# Patient Record
Sex: FEMALE | Race: OTHER | Employment: UNEMPLOYED | URBAN - METROPOLITAN AREA
[De-identification: names, ages, dates, MRNs, and addresses within clinical notes are randomized per-mention and may not be internally consistent; named-entity substitution may affect disease eponyms.]

---

## 2024-04-26 ENCOUNTER — HOSPITAL ENCOUNTER (EMERGENCY)
Facility: HOSPITAL | Age: 34
Discharge: HOME | End: 2024-04-26
Attending: STUDENT IN AN ORGANIZED HEALTH CARE EDUCATION/TRAINING PROGRAM
Payer: COMMERCIAL

## 2024-04-26 ENCOUNTER — APPOINTMENT (OUTPATIENT)
Dept: RADIOLOGY | Facility: HOSPITAL | Age: 34
End: 2024-04-26
Payer: COMMERCIAL

## 2024-04-26 VITALS
RESPIRATION RATE: 18 BRPM | DIASTOLIC BLOOD PRESSURE: 62 MMHG | HEART RATE: 77 BPM | OXYGEN SATURATION: 99 % | BODY MASS INDEX: 26.68 KG/M2 | SYSTOLIC BLOOD PRESSURE: 131 MMHG | WEIGHT: 145 LBS | HEIGHT: 62 IN | TEMPERATURE: 97.3 F

## 2024-04-26 DIAGNOSIS — S92.252A CLOSED AVULSION FRACTURE OF NAVICULAR BONE OF LEFT FOOT, INITIAL ENCOUNTER: Primary | ICD-10-CM

## 2024-04-26 PROCEDURE — 73630 X-RAY EXAM OF FOOT: CPT | Mod: LT

## 2024-04-26 PROCEDURE — 73630 X-RAY EXAM OF FOOT: CPT | Mod: LEFT SIDE | Performed by: RADIOLOGY

## 2024-04-26 PROCEDURE — 99284 EMERGENCY DEPT VISIT MOD MDM: CPT

## 2024-04-26 PROCEDURE — 73610 X-RAY EXAM OF ANKLE: CPT | Mod: LEFT SIDE | Performed by: RADIOLOGY

## 2024-04-26 PROCEDURE — 2500000001 HC RX 250 WO HCPCS SELF ADMINISTERED DRUGS (ALT 637 FOR MEDICARE OP): Performed by: STUDENT IN AN ORGANIZED HEALTH CARE EDUCATION/TRAINING PROGRAM

## 2024-04-26 PROCEDURE — 73610 X-RAY EXAM OF ANKLE: CPT | Mod: LT

## 2024-04-26 PROCEDURE — 99284 EMERGENCY DEPT VISIT MOD MDM: CPT | Performed by: STUDENT IN AN ORGANIZED HEALTH CARE EDUCATION/TRAINING PROGRAM

## 2024-04-26 RX ORDER — IBUPROFEN 400 MG/1
400 TABLET ORAL ONCE
Status: COMPLETED | OUTPATIENT
Start: 2024-04-26 | End: 2024-04-26

## 2024-04-26 RX ORDER — ACETAMINOPHEN 325 MG/1
975 TABLET ORAL ONCE
Status: DISCONTINUED | OUTPATIENT
Start: 2024-04-26 | End: 2024-04-26 | Stop reason: HOSPADM

## 2024-04-26 RX ADMIN — IBUPROFEN 400 MG: 400 TABLET, FILM COATED ORAL at 13:31

## 2024-04-26 ASSESSMENT — COLUMBIA-SUICIDE SEVERITY RATING SCALE - C-SSRS
6. HAVE YOU EVER DONE ANYTHING, STARTED TO DO ANYTHING, OR PREPARED TO DO ANYTHING TO END YOUR LIFE?: NO
2. HAVE YOU ACTUALLY HAD ANY THOUGHTS OF KILLING YOURSELF?: NO
1. IN THE PAST MONTH, HAVE YOU WISHED YOU WERE DEAD OR WISHED YOU COULD GO TO SLEEP AND NOT WAKE UP?: NO

## 2024-04-26 ASSESSMENT — LIFESTYLE VARIABLES
HAVE YOU EVER FELT YOU SHOULD CUT DOWN ON YOUR DRINKING: NO
EVER HAD A DRINK FIRST THING IN THE MORNING TO STEADY YOUR NERVES TO GET RID OF A HANGOVER: NO
TOTAL SCORE: 0
HAVE PEOPLE ANNOYED YOU BY CRITICIZING YOUR DRINKING: NO
EVER FELT BAD OR GUILTY ABOUT YOUR DRINKING: NO

## 2024-04-26 ASSESSMENT — PAIN SCALES - GENERAL: PAINLEVEL_OUTOF10: 10 - WORST POSSIBLE PAIN

## 2024-04-26 ASSESSMENT — PAIN DESCRIPTION - LOCATION: LOCATION: FOOT

## 2024-04-26 ASSESSMENT — PAIN DESCRIPTION - ORIENTATION: ORIENTATION: LEFT

## 2024-04-26 ASSESSMENT — PAIN - FUNCTIONAL ASSESSMENT: PAIN_FUNCTIONAL_ASSESSMENT: 0-10

## 2024-04-26 ASSESSMENT — PAIN DESCRIPTION - PAIN TYPE: TYPE: ACUTE PAIN

## 2024-04-26 NOTE — DISCHARGE INSTRUCTIONS
If you develop fevers, if the limb becomes hard, hot, swollen, red, or if it becomes cold and white or if you lose feeling, or if you become unable to move it, or if you develop tracking redness of the limb, please return to the emergency department immediately for reevaluation.     You can alternate Tylenol and ibuprofen every 4 hours as needed for pain.    Otherwise, please follow-up outpatient with orthopedic surgery and your primary care provider. If you do not have a primary care provider, you may follow-up with the office listed above.

## 2024-04-26 NOTE — ED PROVIDER NOTES
EMERGENCY DEPARTMENT ENCOUNTER      Pt Name: Jessica Silverio  MRN: [id number]  Birthdate 1990  Date of evaluation: 4/26/2024  Provider: Maria Eugenia Rosenberg DO    CHIEF COMPLAINT       Chief Complaint   Patient presents with    Foot Injury     Left          HISTORY OF PRESENT ILLNESS    Otherwise healthy 34-year-old female who presented to the emergency department this morning after tripping on steps when she walked out to grab a package.  She states she is unsure how she came down on the ankle, but developed significant pain in the lateral aspect of the foot and ankle.  When she tried to weight-bear on it, she felt a crunching and severe pain.  No numbness or tingling in the foot, denies any allergies.          Nursing Notes were reviewed.    PAST MEDICAL HISTORY     Past Medical History:   Diagnosis Date    Anxiety disorder, unspecified     Anxiety and depression    Personal history of other diseases of the nervous system and sense organs 03/17/2019    History of eustachian tube dysfunction         SURGICAL HISTORY       Past Surgical History:   Procedure Laterality Date    CHOLECYSTECTOMY  02/24/2016    Cholecystectomy    COLONOSCOPY  02/24/2016    Complete Colonoscopy    UPPER GASTROINTESTINAL ENDOSCOPY  02/24/2016    Endoscopic Ultrasound Upper Gastrointestinal Esophageal         CURRENT MEDICATIONS       There are no discharge medications for this patient.      ALLERGIES     Patient has no known allergies.    FAMILY HISTORY     No family history on file.       SOCIAL HISTORY       Social History     Socioeconomic History    Marital status:      Spouse name: None    Number of children: None    Years of education: None    Highest education level: None   Occupational History    None   Tobacco Use    Smoking status: Never    Smokeless tobacco: Never   Substance and Sexual Activity    Alcohol use: Never    Drug use: Never    Sexual activity: None   Other Topics Concern    None   Social History Narrative    None      Social Determinants of Health     Financial Resource Strain: High Risk (2/22/2021)    Received from [facility]    Overall Financial Resource Strain (CARDIA)     Difficulty of Paying Living Expenses: Hard   Food Insecurity: Food Insecurity Present (2/22/2021)    Received from [facility]    Hunger Vital Sign     Worried About Running Out of Food in the Last Year: Sometimes true     Ran Out of Food in the Last Year: Patient declined   Transportation Needs: Unknown (2/22/2021)    Received from [facility]    PRAPARE - Transportation     Lack of Transportation (Medical): Patient declined     Lack of Transportation (Non-Medical): Patient declined   Physical Activity: Insufficiently Active (2/22/2021)    Received from [facility]    Exercise Vital Sign     Days of Exercise per Week: 1 day     Minutes of Exercise per Session: 20 min   Stress: No Stress Concern Present (2/22/2021)    Received from [facility]    [place] [employer] - Occupational Stress Questionnaire     Feeling of Stress : Only a little   Social Connections: Unknown (2/22/2021)    Received from [facility]    Social Connection and Isolation Panel [NHANES]     Frequency of Communication with Friends and Family: More than three times a week     Frequency of Social Gatherings with Friends and Family: More than three times a week     Attends Mu-ism Services: Patient declined     Active Member of Clubs or Organizations: No     Attends Club or Organization Meetings: Patient declined     Marital Status:    Intimate Partner Violence: Not on file   Housing Stability: Unknown (2/22/2021)    Received from [facility]    Housing Stability Vital Sign     Unable to Pay for Housing in the Last Year: Patient refused     Number of Places Lived in the Last Year: Not on file     Unstable Housing in the Last Year: Patient refused       SCREENINGS                        PHYSICAL EXAM    (up to 7 for level  4, 8 or more for level 5)     ED Triage Vitals   Temperature Heart Rate Respirations BP   04/26/24 1255 04/26/24 1257 04/26/24 1257 04/26/24 1257   36.3 °C (97.3 °F) 77 18 131/62      Pulse Ox Temp Source Heart Rate Source Patient Position   04/26/24 1257 04/26/24 1255 04/26/24 1255 04/26/24 1257   99 % Temporal Monitor Sitting      BP Location FiO2 (%)     04/26/24 1257 --     Right arm        Physical Exam  Vitals and nursing note reviewed.   Constitutional:       General: She is not in acute distress.     Appearance: She is well-developed.   HENT:      Head: Normocephalic and atraumatic.   Eyes:      Conjunctiva/sclera: Conjunctivae normal.   Cardiovascular:      Rate and Rhythm: Normal rate and regular rhythm.      Heart sounds: No murmur heard.  Pulmonary:      Effort: Pulmonary effort is normal. No respiratory distress.      Breath sounds: Normal breath sounds.   Abdominal:      Palpations: Abdomen is soft.      Tenderness: There is no abdominal tenderness.   Musculoskeletal:         General: Swelling (Distal to lateral malleolus of left foot) and tenderness (Left lateral midfoot.  Medial midfoot and forefoot without point tenderness.) present.      Cervical back: Neck supple.      Comments: Neurovascularly intact distal to the injury   Skin:     General: Skin is warm and dry.      Capillary Refill: Capillary refill takes less than 2 seconds.   Neurological:      Mental Status: She is alert.   Psychiatric:         Mood and Affect: Mood normal.          DIAGNOSTIC RESULTS     LABS:  Labs Reviewed - No data to display    All other labs were within normal range or not returned as of this dictation.    Imaging  XR ankle left 3+ views   Final Result   Acute avulsion fracture off of the dorsal cortex of the navicular   bone on the lateral views with overlying soft tissue swelling.        MACRO:   None        Signed by: Gregorio Ortiz 4/26/2024 1:30 PM   Dictation workstation:   [id number]      XR foot left 3+ views    Final Result   Acute avulsion fracture off of the dorsal cortex of the navicular   bone on the lateral views with overlying soft tissue swelling.        MACRO:   None        Signed by: Gregorio Ortiz 4/26/2024 1:30 PM   Dictation workstation:   [id number]           Procedures  Procedures     EMERGENCY DEPARTMENT COURSE/MDM:   Jessica Silverio is a 34 y.o. female presenting to the ED for evaluation of had concerns including Foot Injury (Left )..   Medical Decision Making  Given ice and ibuprofen.  Radiographs obtained of the foot and ankle.  Avulsion fracture of the navicular bone identified.  Patient was placed in the space boot.  She was given crutches.  She is to follow-up outpatient with orthopedic surgery.  Discharged in stable condition        Diagnoses as of 04/26/24 2136   Closed avulsion fracture of navicular bone of left foot, initial encounter          External records reviewed: I reviewed external records including outpatient, PCP records, and prior discharge summaries    I have reviewed this case with the ED attending physician, and the attending agrees with the plan. Patient or family was counselled regarding labs, imaging, likely diagnosis, and plan. All questions were answered.     Maria Eugenia Rosenberg DO  PGY-4, emergency medicine    The above documentation was completed with the use of speech recognition software. It may contain dictation errors secondary to limitations of the software.      ED Medications administered this visit:    Medications   ibuprofen tablet 400 mg (400 mg oral Given 4/26/24 1331)       New Prescriptions from this visit:    There are no discharge medications for this patient.      Final Impression:   1. Closed avulsion fracture of navicular bone of left foot, initial encounter          (Please note that portions of this note were completed with a voice recognition program.  Efforts were made to edit the dictations but occasionally words are mis-transcribed.)     Maria Eugenia Rosenberg,  DO  Resident  04/26/24 2137       Maria Eugenia Rosenberg, DO  Resident  04/26/24 2137

## 2024-05-01 ENCOUNTER — OFFICE VISIT (OUTPATIENT)
Dept: ORTHOPEDIC SURGERY | Facility: CLINIC | Age: 34
End: 2024-05-01
Payer: COMMERCIAL

## 2024-05-01 ENCOUNTER — TELEPHONE (OUTPATIENT)
Dept: ORTHOPEDIC SURGERY | Facility: CLINIC | Age: 34
End: 2024-05-01

## 2024-05-01 DIAGNOSIS — S93.402A MILD ANKLE SPRAIN, LEFT, INITIAL ENCOUNTER: ICD-10-CM

## 2024-05-01 PROCEDURE — 1036F TOBACCO NON-USER: CPT | Performed by: INTERNAL MEDICINE

## 2024-05-01 PROCEDURE — 28450 TX TARSAL B1 FX W/O MNPJ EA: CPT | Performed by: INTERNAL MEDICINE

## 2024-05-01 PROCEDURE — 25622 CLTX CARPL SCPHD FX W/O MNPJ: CPT | Performed by: INTERNAL MEDICINE

## 2024-05-01 PROCEDURE — 99204 OFFICE O/P NEW MOD 45 MIN: CPT | Performed by: INTERNAL MEDICINE

## 2024-05-01 PROCEDURE — 99213 OFFICE O/P EST LOW 20 MIN: CPT | Mod: 57 | Performed by: INTERNAL MEDICINE

## 2024-05-01 NOTE — PROGRESS NOTES
Acute Injury New Patient Visit    CC:   Chief Complaint   Patient presents with    Left Foot - Pain     Lt foot injury  Twisted ankle 4/28  Xrays today       HPI: Jessica is a 34 y.o. female presents today for evaluation for acute left foot/ankle injury sustained last Friday after she missed a step and twisted her left ankle. She is here for initial evaluation.        Review of Systems   GENERAL: Negative for malaise, significant weight loss, fever  MUSCULOSKELETAL: See HPI  NEURO:  Negative for numbness / tingling     Past Medical History  Past Medical History:   Diagnosis Date    Anxiety disorder, unspecified     Anxiety and depression    Personal history of other diseases of the nervous system and sense organs 03/17/2019    History of eustachian tube dysfunction       Medication review  Medication Documentation Review Audit       Reviewed by Tennille Trivedi MA (Technologist) on 05/01/24 at 1600      Medication Order Taking? Sig Documenting Provider Last Dose Status            No Medications to Display                                   Allergies  No Known Allergies    Social History  Social History     Socioeconomic History    Marital status:      Spouse name: Not on file    Number of children: Not on file    Years of education: Not on file    Highest education level: Not on file   Occupational History    Not on file   Tobacco Use    Smoking status: Never    Smokeless tobacco: Never   Substance and Sexual Activity    Alcohol use: Never    Drug use: Never    Sexual activity: Not on file   Other Topics Concern    Not on file   Social History Narrative    Not on file     Social Determinants of Health     Financial Resource Strain: High Risk (2/22/2021)    Received from [facility]    Overall Financial Resource Strain (CARDIA)     Difficulty of Paying Living Expenses: Hard   Food Insecurity: Food Insecurity Present (2/22/2021)    Received from [facility]    Hunger Vital Sign     Worried About  Running Out of Food in the Last Year: Sometimes true     Ran Out of Food in the Last Year: Patient declined   Transportation Needs: Unknown (2/22/2021)    Received from [facility]    PRAPARE - Transportation     Lack of Transportation (Medical): Patient declined     Lack of Transportation (Non-Medical): Patient declined   Physical Activity: Insufficiently Active (2/22/2021)    Received from [facility]    Exercise Vital Sign     Days of Exercise per Week: 1 day     Minutes of Exercise per Session: 20 min   Stress: No Stress Concern Present (2/22/2021)    Received from [facility]    [place] [employer]ational [employer] - Occupational Stress Questionnaire     Feeling of Stress : Only a little   Social Connections: Unknown (2/22/2021)    Received from [facility]    Social Connection and Isolation Panel [NHANES]     Frequency of Communication with Friends and Family: More than three times a week     Frequency of Social Gatherings with Friends and Family: More than three times a week     Attends Yarsani Services: Patient declined     Active Member of Clubs or Organizations: No     Attends Club or Organization Meetings: Patient declined     Marital Status:    Intimate Partner Violence: Not on file   Housing Stability: Unknown (2/22/2021)    Received from [facility]    Housing Stability Vital Sign     Unable to Pay for Housing in the Last Year: Patient refused     Number of Places Lived in the Last Year: Not on file     Unstable Housing in the Last Year: Patient refused       Surgical History  Past Surgical History:   Procedure Laterality Date    CHOLECYSTECTOMY  02/24/2016    Cholecystectomy    COLONOSCOPY  02/24/2016    Complete Colonoscopy    UPPER GASTROINTESTINAL ENDOSCOPY  02/24/2016    Endoscopic Ultrasound Upper Gastrointestinal Esophageal       Physical Exam:  GENERAL:  Patient is awake, alert, and oriented to person place and time.  Patient appears well nourished and well  kept.  Affect Calm, Not Acutely Distressed.  HEENT:  Normocephalic, Atraumatic, EOMI  CARDIOVASCULAR:  Hemodynamically stable.  RESPIRATORY:  Normal respirations with unlabored breathing.  Extremity: Left foot examination shows skin is intact.  There is no erythema or warmth.  Swelling localized on the lateral aspect with associated bruising and ecchymosis.  There is no clinical signs of infection.  There is no pain over the base of the fifth metatarsal bone.  Pain over the dorsal aspect of the navicular bone.  There is no pain in the midfoot.  No pain over the metatarsal bones.  No pain of the cuboid bone.  There is no pain in the calcaneus.  There is no pain over the plantar aponeurosis.  There is no pain over the proximal phalanx of the right great toe.  No pain over distal phalanx of the right great toe.  Neurovascularly intact.    Left ankle shows skin is intact.  There is no erythema or warmth.  Swelling localized on the lateral aspect with bruising and ecchymosis there is no clinical signs of infection.  There is no pain over the lateral malleolus.  There is no pain of the medial malleolus.  Pain over the ATF, CF and PTF ligament.  There is mild pain over the deltoid ligament.  No pain over the Achilles tendon.  Negative Shaffer's test.  Negative squeeze test.  Negative anterior drawer test.  Negative talar tilt test.  No pain over the anterior process of the talus.  There is no pain over the talar dome.  There is no pain at the base of the fifth metatarsal bone.  No pain of the calcaneus.  No pain over the plantar aponeurosis.  No pain of the midfoot.  Neurovascularly intact.      Diagnostics: X-rays reviewed  XR ankle left 3+ views, XR foot left 3+ views  Narrative: Interpreted By:  Gregorio Ortiz,   STUDY:  XR ANKLE LEFT 3+ VIEWS; XR FOOT LEFT 3+ VIEWS;  4/26/2024 1:18 pm      INDICATION:  Signs/Symptoms:injury.      COMPARISON:  None.      ACCESSION NUMBER(S):  [id number]; [id number]      ORDERING  CLINICIAN:  COURTNEY CALDERÓN      TECHNIQUE:  Three views each of the left foot and left ankle were obtained.      FINDINGS:  No significant osteophytic change.  No lytic or blastic destructive bone lesion.  Dorsal midfoot soft tissue swelling. Tiny avulsion fracture fragment  off of the dorsal cortex of the navicular on the lateral view, just  distal to the talonavicular joint space. No other fracture in the  current exams.  Ankle mortise and talar dome are intact. No opaque  soft tissue foreign body. No periosteal reaction or erosion.      Impression: Acute avulsion fracture off of the dorsal cortex of the navicular  bone on the lateral views with overlying soft tissue swelling.      MACRO:  None      Signed by: Gregorio Ortiz 4/26/2024 1:30 PM  Dictation workstation:   [id number]      Procedure: None    Assessment:   Acute avulsion fracture of navicular bone  Left ankle sprain    Plan: Jessica presents today for initial evaluation for acute left foot/ankle injury sustained last Friday. We recommended non surgical treatment by placing her into a fracture boot, ice, elevation, and anti-inflammatories. She will follow-up in 10-14 days, reevaluate her foot and ankle, if doing well, lace up ankle brace.  May consider physical therapy at that time.    No orders of the defined types were placed in this encounter.     At the conclusion of the visit there were no further questions by the patient/family regarding their plan of care.  Patient was instructed to call or return with any issues, questions, or concerns regarding their injury and/or treatment plan described above.     05/01/24 at 4:01 PM - Christopher Fajardo MD  Scribe Attestation  By signing my name below, IScot Scribe   attest that this documentation has been prepared under the direction and in the presence of Christopher Fajardo MD.    Office: [phone]    This note was prepared using voice recognition software.  The details of this note are  correct and have been reviewed, and corrected to the best of my ability.  Some grammatical errors may persist related to the Dragon software.

## 2024-05-01 NOTE — LETTER
May 1, 2024     Patient: Jessica Silverio   YOB: 1990   Date of Visit: 5/1/2024       To Whom It May Concern:    It is my medical opinion that Jessica Silverio may return to work on 5/6/2024 . Please excuse Jessica 5/1/2024-5/5/2024. She may return wearing a walking boot as needed during the work day.     If you have any questions or concerns, please don't hesitate to call.         Sincerely,        Christopher Fajardo MD    CC: No Recipients

## 2024-05-14 ENCOUNTER — OFFICE VISIT (OUTPATIENT)
Dept: ORTHOPEDIC SURGERY | Facility: CLINIC | Age: 34
End: 2024-05-14
Payer: COMMERCIAL

## 2024-05-14 DIAGNOSIS — S93.402A MILD ANKLE SPRAIN, LEFT, INITIAL ENCOUNTER: Primary | ICD-10-CM

## 2024-05-14 DIAGNOSIS — S92.251A AVULSION FRACTURE OF NAVICULAR BONE OF FOOT, RIGHT, CLOSED, INITIAL ENCOUNTER: ICD-10-CM

## 2024-05-14 PROCEDURE — 99024 POSTOP FOLLOW-UP VISIT: CPT | Performed by: INTERNAL MEDICINE

## 2024-05-14 PROCEDURE — L1902 AFO ANKLE GAUNTLET PRE OTS: HCPCS | Performed by: INTERNAL MEDICINE

## 2024-05-14 NOTE — PROGRESS NOTES
CC:   Chief Complaint   Patient presents with    Left Ankle - Follow-up, Pain, Foot Swelling     Acute avulsion fracture of navicular bone   Sprain  DOI:4/28//24   Re evaluate today         HPI: Jessica is a 34 y.o. female presents today for reevaluation for avulsion fracture of the left navicular bone. She notes that she is getting better, still has pain with certain motions.         Review of Systems   GENERAL: Negative for malaise, significant weight loss, fever  MUSCULOSKELETAL: See HPI  NEURO:  Negative for numbness / tingling     Past Medical History  Past Medical History:   Diagnosis Date    Anxiety disorder, unspecified     Anxiety and depression    Personal history of other diseases of the nervous system and sense organs 03/17/2019    History of eustachian tube dysfunction       Medication review  Medication Documentation Review Audit       Reviewed by Tennille Trivedi MA (Technologist) on 05/01/24 at 1600      Medication Order Taking? Sig Documenting Provider Last Dose Status            No Medications to Display                                   Allergies  No Known Allergies    Social History  Social History     Socioeconomic History    Marital status:      Spouse name: Not on file    Number of children: Not on file    Years of education: Not on file    Highest education level: Not on file   Occupational History    Not on file   Tobacco Use    Smoking status: Never    Smokeless tobacco: Never   Substance and Sexual Activity    Alcohol use: Never    Drug use: Never    Sexual activity: Not on file   Other Topics Concern    Not on file   Social History Narrative    Not on file     Social Determinants of Health     Financial Resource Strain: High Risk (2/22/2021)    Received from [facility]    Overall Financial Resource Strain (CARDIA)     Difficulty of Paying Living Expenses: Hard   Food Insecurity: Food Insecurity Present (2/22/2021)    Received from [facility]    Hunger Vital Sign      Worried About Running Out of Food in the Last Year: Sometimes true     Ran Out of Food in the Last Year: Patient declined   Transportation Needs: Unknown (2/22/2021)    Received from [facility]    PRAPARE - Transportation     Lack of Transportation (Medical): Patient declined     Lack of Transportation (Non-Medical): Patient declined   Physical Activity: Insufficiently Active (2/22/2021)    Received from [facility]    Exercise Vital Sign     Days of Exercise per Week: 1 day     Minutes of Exercise per Session: 20 min   Stress: No Stress Concern Present (2/22/2021)    Received from [facility]    [place] [employer] - Occupational Stress Questionnaire     Feeling of Stress : Only a little   Social Connections: Unknown (2/22/2021)    Received from [facility]    Social Connection and Isolation Panel [NHANES]     Frequency of Communication with Friends and Family: More than three times a week     Frequency of Social Gatherings with Friends and Family: More than three times a week     Attends Mandaen Services: Patient declined     Active Member of Clubs or Organizations: No     Attends Club or Organization Meetings: Patient declined     Marital Status:    Intimate Partner Violence: Not on file   Housing Stability: Unknown (2/22/2021)    Received from [facility]    Housing Stability Vital Sign     Unable to Pay for Housing in the Last Year: Patient refused     Number of Places Lived in the Last Year: Not on file     Unstable Housing in the Last Year: Patient refused       Surgical History  Past Surgical History:   Procedure Laterality Date    CHOLECYSTECTOMY  02/24/2016    Cholecystectomy    COLONOSCOPY  02/24/2016    Complete Colonoscopy    UPPER GASTROINTESTINAL ENDOSCOPY  02/24/2016    Endoscopic Ultrasound Upper Gastrointestinal Esophageal       Physical Exam:  GENERAL:  Patient is awake, alert, and oriented to person place and time.  Patient appears well  nourished and well kept.  Affect Calm, Not Acutely Distressed.  HEENT:  Normocephalic, Atraumatic, EOMI  CARDIOVASCULAR:  Hemodynamically stable.  RESPIRATORY:  Normal respirations with unlabored breathing.  Extremity: Left foot examination shows skin is intact.  There is no erythema or warmth.  Resolved swelling localized on the lateral aspect with solved bruising and ecchymosis.  There is no clinical signs of infection.  There is no pain over the base of the fifth metatarsal bone.  Minimal pain over the dorsal aspect of the navicular bone.  There is no pain in the midfoot.  No pain over the metatarsal bones.  No pain of the cuboid bone.  There is no pain in the calcaneus.  There is no pain over the plantar aponeurosis.  There is no pain over the proximal phalanx of the right great toe.  No pain over distal phalanx of the right great toe.  Neurovascularly intact.     Left ankle shows skin is intact.  There is no erythema or warmth.  Resolved swelling localized on the lateral aspect with resolved bruising and ecchymosis there is no clinical signs of infection.  There is no pain over the lateral malleolus.  There is no pain of the medial malleolus.  No pain over the ATF, CF or PTF ligament.  No pain over the deltoid ligament.  No pain over the Achilles tendon.  Negative Shaffer's test.  Negative squeeze test.  Negative anterior drawer test.  Negative talar tilt test.  No pain over the anterior process of the talus.  There is no pain over the talar dome.  There is no pain at the base of the fifth metatarsal bone.  No pain of the calcaneus.  No pain over the plantar aponeurosis.  No pain of the midfoot.  Neurovascularly intact.       Diagnostics: X-rays reviewed  XR ankle left 3+ views, XR foot left 3+ views  Narrative: Interpreted By:  Gregorio Ortiz,   STUDY:  XR ANKLE LEFT 3+ VIEWS; XR FOOT LEFT 3+ VIEWS;  4/26/2024 1:18 pm      INDICATION:  Signs/Symptoms:injury.      COMPARISON:  None.      ACCESSION  NUMBER(S):  [id number]; [id number]      ORDERING CLINICIAN:  COURTNEY CALDERÓN      TECHNIQUE:  Three views each of the left foot and left ankle were obtained.      FINDINGS:  No significant osteophytic change.  No lytic or blastic destructive bone lesion.  Dorsal midfoot soft tissue swelling. Tiny avulsion fracture fragment  off of the dorsal cortex of the navicular on the lateral view, just  distal to the talonavicular joint space. No other fracture in the  current exams.  Ankle mortise and talar dome are intact. No opaque  soft tissue foreign body. No periosteal reaction or erosion.      Impression: Acute avulsion fracture off of the dorsal cortex of the navicular  bone on the lateral views with overlying soft tissue swelling.      MACRO:  None      Signed by: Gregorio Ortiz 4/26/2024 1:30 PM  Dictation workstation:   [id number]        Procedure: None    Assessment:   Acute avulsion fracture of navicular bone  Left ankle sprain    Plan: Jessica presents today for reevaluation for avulsion fracture of the left navicular bone. She is clinically doing better, some mild pain. We weaned her from the fracture boot and placed her into a lace up ankle brace.  Will also get involved some formal physical therapy to improve her range of motion and strength and to improve her ankle stability, she will follow-up as needed.    No orders of the defined types were placed in this encounter.     At the conclusion of the visit there were no further questions by the patient/family regarding their plan of care.  Patient was instructed to call or return with any issues, questions, or concerns regarding their injury and/or treatment plan described above.     05/14/24 at 11:10 AM - Christopher Fajardo MD  Scribe Attestation  By signing my name below, IScot Scribe   attest that this documentation has been prepared under the direction and in the presence of Christopher Fajardo MD.    Office: [phone]    This note was  prepared using voice recognition software.  The details of this note are correct and have been reviewed, and corrected to the best of my ability.  Some grammatical errors may persist related to the Dragon software.

## 2025-07-30 ENCOUNTER — HOSPITAL ENCOUNTER (EMERGENCY)
Facility: HOSPITAL | Age: 35
Discharge: HOME | End: 2025-07-30
Attending: STUDENT IN AN ORGANIZED HEALTH CARE EDUCATION/TRAINING PROGRAM
Payer: COMMERCIAL

## 2025-07-30 ENCOUNTER — APPOINTMENT (OUTPATIENT)
Dept: RADIOLOGY | Facility: HOSPITAL | Age: 35
End: 2025-07-30
Payer: COMMERCIAL

## 2025-07-30 VITALS
TEMPERATURE: 97.5 F | HEIGHT: 63 IN | HEART RATE: 82 BPM | SYSTOLIC BLOOD PRESSURE: 130 MMHG | WEIGHT: 153 LBS | OXYGEN SATURATION: 98 % | BODY MASS INDEX: 27.11 KG/M2 | RESPIRATION RATE: 16 BRPM | DIASTOLIC BLOOD PRESSURE: 88 MMHG

## 2025-07-30 DIAGNOSIS — M54.2 CERVICAL MUSCLE PAIN: ICD-10-CM

## 2025-07-30 DIAGNOSIS — G44.209 TENSION HEADACHE: Primary | ICD-10-CM

## 2025-07-30 LAB
ALBUMIN SERPL BCP-MCNC: 4.6 G/DL (ref 3.4–5)
ALP SERPL-CCNC: 50 U/L (ref 33–110)
ALT SERPL W P-5'-P-CCNC: 9 U/L (ref 7–45)
ANION GAP SERPL CALC-SCNC: 13 MMOL/L (ref 10–20)
AST SERPL W P-5'-P-CCNC: 12 U/L (ref 9–39)
B-HCG SERPL-ACNC: <2 MIU/ML
BASOPHILS # BLD AUTO: 0.03 X10*3/UL (ref 0–0.1)
BASOPHILS NFR BLD AUTO: 0.4 %
BILIRUB SERPL-MCNC: 0.5 MG/DL (ref 0–1.2)
BUN SERPL-MCNC: 8 MG/DL (ref 6–23)
CALCIUM SERPL-MCNC: 9.4 MG/DL (ref 8.6–10.3)
CHLORIDE SERPL-SCNC: 105 MMOL/L (ref 98–107)
CO2 SERPL-SCNC: 23 MMOL/L (ref 21–32)
CREAT SERPL-MCNC: 0.57 MG/DL (ref 0.5–1.05)
EGFRCR SERPLBLD CKD-EPI 2021: >90 ML/MIN/1.73M*2
EOSINOPHIL # BLD AUTO: 0.03 X10*3/UL (ref 0–0.7)
EOSINOPHIL NFR BLD AUTO: 0.4 %
ERYTHROCYTE [DISTWIDTH] IN BLOOD BY AUTOMATED COUNT: 13 % (ref 11.5–14.5)
GLUCOSE SERPL-MCNC: 93 MG/DL (ref 74–99)
HCT VFR BLD AUTO: 44.1 % (ref 36–46)
HGB BLD-MCNC: 14 G/DL (ref 12–16)
IMM GRANULOCYTES # BLD AUTO: 0.02 X10*3/UL (ref 0–0.7)
IMM GRANULOCYTES NFR BLD AUTO: 0.3 % (ref 0–0.9)
LYMPHOCYTES # BLD AUTO: 1.01 X10*3/UL (ref 1.2–4.8)
LYMPHOCYTES NFR BLD AUTO: 14.7 %
MCH RBC QN AUTO: 26.7 PG (ref 26–34)
MCHC RBC AUTO-ENTMCNC: 31.7 G/DL (ref 32–36)
MCV RBC AUTO: 84 FL (ref 80–100)
MONOCYTES # BLD AUTO: 0.28 X10*3/UL (ref 0.1–1)
MONOCYTES NFR BLD AUTO: 4.1 %
NEUTROPHILS # BLD AUTO: 5.48 X10*3/UL (ref 1.2–7.7)
NEUTROPHILS NFR BLD AUTO: 80.1 %
NRBC BLD-RTO: 0 /100 WBCS (ref 0–0)
PLATELET # BLD AUTO: 188 X10*3/UL (ref 150–450)
POTASSIUM SERPL-SCNC: 3.8 MMOL/L (ref 3.5–5.3)
PROT SERPL-MCNC: 8.5 G/DL (ref 6.4–8.2)
RBC # BLD AUTO: 5.25 X10*6/UL (ref 4–5.2)
SODIUM SERPL-SCNC: 137 MMOL/L (ref 136–145)
WBC # BLD AUTO: 6.9 X10*3/UL (ref 4.4–11.3)

## 2025-07-30 PROCEDURE — 36415 COLL VENOUS BLD VENIPUNCTURE: CPT | Performed by: STUDENT IN AN ORGANIZED HEALTH CARE EDUCATION/TRAINING PROGRAM

## 2025-07-30 PROCEDURE — 85025 COMPLETE CBC W/AUTO DIFF WBC: CPT | Performed by: STUDENT IN AN ORGANIZED HEALTH CARE EDUCATION/TRAINING PROGRAM

## 2025-07-30 PROCEDURE — 70450 CT HEAD/BRAIN W/O DYE: CPT | Performed by: STUDENT IN AN ORGANIZED HEALTH CARE EDUCATION/TRAINING PROGRAM

## 2025-07-30 PROCEDURE — 99285 EMERGENCY DEPT VISIT HI MDM: CPT | Mod: 25 | Performed by: STUDENT IN AN ORGANIZED HEALTH CARE EDUCATION/TRAINING PROGRAM

## 2025-07-30 PROCEDURE — 80053 COMPREHEN METABOLIC PANEL: CPT | Performed by: STUDENT IN AN ORGANIZED HEALTH CARE EDUCATION/TRAINING PROGRAM

## 2025-07-30 PROCEDURE — 72125 CT NECK SPINE W/O DYE: CPT

## 2025-07-30 PROCEDURE — 2500000004 HC RX 250 GENERAL PHARMACY W/ HCPCS (ALT 636 FOR OP/ED): Mod: JZ | Performed by: STUDENT IN AN ORGANIZED HEALTH CARE EDUCATION/TRAINING PROGRAM

## 2025-07-30 PROCEDURE — 70450 CT HEAD/BRAIN W/O DYE: CPT

## 2025-07-30 PROCEDURE — 2500000001 HC RX 250 WO HCPCS SELF ADMINISTERED DRUGS (ALT 637 FOR MEDICARE OP): Performed by: STUDENT IN AN ORGANIZED HEALTH CARE EDUCATION/TRAINING PROGRAM

## 2025-07-30 PROCEDURE — 96374 THER/PROPH/DIAG INJ IV PUSH: CPT | Mod: 59

## 2025-07-30 PROCEDURE — 72125 CT NECK SPINE W/O DYE: CPT | Performed by: STUDENT IN AN ORGANIZED HEALTH CARE EDUCATION/TRAINING PROGRAM

## 2025-07-30 PROCEDURE — 2550000001 HC RX 255 CONTRASTS: Performed by: STUDENT IN AN ORGANIZED HEALTH CARE EDUCATION/TRAINING PROGRAM

## 2025-07-30 PROCEDURE — 84702 CHORIONIC GONADOTROPIN TEST: CPT | Performed by: STUDENT IN AN ORGANIZED HEALTH CARE EDUCATION/TRAINING PROGRAM

## 2025-07-30 PROCEDURE — 70496 CT ANGIOGRAPHY HEAD: CPT

## 2025-07-30 RX ORDER — KETOROLAC TROMETHAMINE 15 MG/ML
15 INJECTION, SOLUTION INTRAMUSCULAR; INTRAVENOUS ONCE
Status: COMPLETED | OUTPATIENT
Start: 2025-07-30 | End: 2025-07-30

## 2025-07-30 RX ORDER — ACETAMINOPHEN 325 MG/1
650 TABLET ORAL ONCE
Status: COMPLETED | OUTPATIENT
Start: 2025-07-30 | End: 2025-07-30

## 2025-07-30 RX ORDER — METOCLOPRAMIDE HYDROCHLORIDE 5 MG/ML
10 INJECTION INTRAMUSCULAR; INTRAVENOUS ONCE
Status: DISCONTINUED | OUTPATIENT
Start: 2025-07-30 | End: 2025-07-30 | Stop reason: HOSPADM

## 2025-07-30 RX ADMIN — IOHEXOL 70 ML: 350 INJECTION, SOLUTION INTRAVENOUS at 18:24

## 2025-07-30 RX ADMIN — ACETAMINOPHEN 650 MG: 325 TABLET ORAL at 17:15

## 2025-07-30 RX ADMIN — KETOROLAC TROMETHAMINE 15 MG: 15 INJECTION, SOLUTION INTRAMUSCULAR; INTRAVENOUS at 18:49

## 2025-07-30 ASSESSMENT — PAIN SCALES - GENERAL
PAINLEVEL_OUTOF10: 6
PAINLEVEL_OUTOF10: 0 - NO PAIN

## 2025-07-30 ASSESSMENT — PAIN - FUNCTIONAL ASSESSMENT
PAIN_FUNCTIONAL_ASSESSMENT: 0-10
PAIN_FUNCTIONAL_ASSESSMENT: 0-10

## 2025-07-30 ASSESSMENT — PAIN DESCRIPTION - LOCATION: LOCATION: HEAD

## 2025-07-30 ASSESSMENT — LIFESTYLE VARIABLES
HAVE PEOPLE ANNOYED YOU BY CRITICIZING YOUR DRINKING: NO
EVER HAD A DRINK FIRST THING IN THE MORNING TO STEADY YOUR NERVES TO GET RID OF A HANGOVER: NO
HAVE YOU EVER FELT YOU SHOULD CUT DOWN ON YOUR DRINKING: NO
TOTAL SCORE: 0
EVER FELT BAD OR GUILTY ABOUT YOUR DRINKING: NO

## 2025-07-30 ASSESSMENT — PAIN DESCRIPTION - PAIN TYPE: TYPE: ACUTE PAIN

## 2025-07-30 NOTE — ED TRIAGE NOTES
Patient reports headache felt in back of head that radiates down to mid back X1 week. Headache is off and on. Reports occasionally feeling a cool sensation in arms when she lifts her shoulders.

## 2025-07-30 NOTE — DISCHARGE INSTRUCTIONS
You were evaluated in the emergency department for a headache. Your work-up did not reveal any acute, emergent findings requiring intervention that would be the cause of your symptoms.     Please follow up with your primary care for a follow up emergency room visit.    Please seek immediate medical attention if you develop: worsening headache, nausea, vomiting, confusion, weakness, loss of movement in your arms or legs, loss of control of your bladder or bowels, difficulty waking form sleep, neck pain, fever above 100.4F, or any new or concerning symptoms.

## 2025-07-30 NOTE — ED PROVIDER NOTES
EMERGENCY DEPARTMENT ENCOUNTER      Pt Name: Jessica Silverio  MRN: [id number]  Birthdate 1990  Date of evaluation: 7/30/2025  Provider: Stefani LoPresti, DO    CHIEF COMPLAINT       Chief Complaint   Patient presents with    Headache         HISTORY OF PRESENT ILLNESS    Patient is a 35-year-old female with no significant past medical history presenting to the ED with pain from her occiput down to the mid Thoracics.  States that it just feels tight and occasionally is throbbing.  The pain initially started in the back of her head and has been going on for an extended period of time but over the past month it has also moved down to her Thoracics.  It does feel tight when she is turning her head but she is not getting shooting pain.  The pain has been on and off.  Overall she feels fatigued.  She has tried Motrin and heating pad which has not helped a ton.  She has never experienced anything like this before.  She states that she has had some lightheadedness that just an overall foggy feeling.  She does notice that when she is anxious it does worsen the pain.  She denies any blurry vision, dizziness, syncope, chest pain, shortness of breath, numbness, tingling, incontinence or saddle paresthesias or vomiting.      History provided by:  Patient   used: No        Nursing Notes were reviewed.    PAST MEDICAL HISTORY   Medical History[1]      SURGICAL HISTORY     Surgical History[2]      CURRENT MEDICATIONS       There are no discharge medications for this patient.      ALLERGIES     Patient has no known allergies.    FAMILY HISTORY     Family History[3]       SOCIAL HISTORY     Social History[4]    SCREENINGS                        PHYSICAL EXAM    (up to 7 for level 4, 8 or more for level 5)     ED Triage Vitals [07/30/25 1324]   Temperature Heart Rate Respirations BP   36.4 °C (97.5 °F) 83 15 138/88      Pulse Ox Temp Source Heart Rate Source Patient Position   99 % Temporal Monitor Sitting      BP  Location FiO2 (%)     Right arm --       Physical Exam  Vitals and nursing note reviewed.   Constitutional:       General: She is not in acute distress.     Appearance: Normal appearance. She is not ill-appearing.   HENT:      Head: Normocephalic and atraumatic.      Nose: Nose normal.      Mouth/Throat:      Mouth: Mucous membranes are moist.     Eyes:      Extraocular Movements: Extraocular movements intact.      Pupils: Pupils are equal, round, and reactive to light.       Cardiovascular:      Rate and Rhythm: Normal rate and regular rhythm.      Pulses: Normal pulses.      Heart sounds: Normal heart sounds.   Pulmonary:      Effort: Pulmonary effort is normal. No respiratory distress.      Breath sounds: No stridor. No wheezing.   Abdominal:      General: Abdomen is flat. There is no distension.      Palpations: Abdomen is soft.      Tenderness: There is no abdominal tenderness.     Musculoskeletal:      Cervical back: Normal range of motion and neck supple. No rigidity. Muscular tenderness present. No spinous process tenderness. Normal range of motion.      Right lower leg: No edema.      Left lower leg: No edema.     Skin:     General: Skin is warm and dry.      Capillary Refill: Capillary refill takes less than 2 seconds.     Neurological:      General: No focal deficit present.      Mental Status: She is alert and oriented to person, place, and time.      Cranial Nerves: Cranial nerves 2-12 are intact. No cranial nerve deficit.      Sensory: Sensation is intact. No sensory deficit.      Motor: Motor function is intact. No weakness.      Gait: Gait is intact.     Psychiatric:         Mood and Affect: Mood normal.         Behavior: Behavior normal.          DIAGNOSTIC RESULTS     LABS:  Labs Reviewed   CBC WITH AUTO DIFFERENTIAL - Abnormal       Result Value    WBC 6.9      nRBC 0.0      RBC 5.25 (*)     Hemoglobin 14.0      Hematocrit 44.1      MCV 84      MCH 26.7      MCHC 31.7 (*)     RDW 13.0       Platelets 188      Neutrophils % 80.1      Immature Granulocytes %, Automated 0.3      Lymphocytes % 14.7      Monocytes % 4.1      Eosinophils % 0.4      Basophils % 0.4      Neutrophils Absolute 5.48      Immature Granulocytes Absolute, Automated 0.02      Lymphocytes Absolute 1.01 (*)     Monocytes Absolute 0.28      Eosinophils Absolute 0.03      Basophils Absolute 0.03     COMPREHENSIVE METABOLIC PANEL - Abnormal    Glucose 93      Sodium 137      Potassium 3.8      Chloride 105      Bicarbonate 23      Anion Gap 13      Urea Nitrogen 8      Creatinine 0.57      eGFR >90      Calcium 9.4      Albumin 4.6      Alkaline Phosphatase 50      Total Protein 8.5 (*)     AST 12      Bilirubin, Total 0.5      ALT 9     HUMAN CHORIONIC GONADOTROPIN, SERUM QUANTITATIVE - Normal    HCG, Beta-Quantitative <2      Narrative:      Total HCG measurement is performed using the [employer]   Immunoassay which detects intact HCG and free beta HCG subunit.    This test is not indicated for use as a tumor marker.   HCG testing is performed using a different test methodology at [facility] than other [facility]. Direct result comparison   should only be made within the same method.           All other labs were within normal range or not returned as of this dictation.    Imaging  CT angio head and neck w and wo IV contrast   Final Result   The CT angiogram through the upper thorax demonstrates no significant   stenosis along the origins of the right brachiocephalic artery, left   common carotid artery, or left subclavian artery. No significant   stenosis noted along the origins of the vertebral arteries.        The CT angiogram of the neck demonstrates no significant stenosis   along the common carotid arteries, carotid bifurcations, or cervical   segments of the internal carotid arteries. No significant stenosis   noted along the cervical segments of the vertebral arteries.        The CT angiogram  images of the head demonstrate no significant   stenosis along the distal internal carotid arteries, distal vertebral   arteries, or basilar artery. No large vessel branch cutoffs of the   anterior cerebral arteries, middle cerebral arteries, or posterior   cerebral arteries are noted. No intracranial aneurysm is noted.        Please see the dedicated reports of the CT of the head and CT of the   cervical spine dated 07/30/2025 for further details of findings in   these regions.        MACRO:   None        Signed by: Marcio Peterson 7/30/2025 7:09 PM   Dictation workstation:   [id number]      CT head wo IV contrast   Final Result        No acute intracranial abnormality. Redemonstration of an empty sella,   nonspecific but may be seen with idiopathic intracranial hypertension.        No fracture, malalignment or spondylosis of the cervical spine.        MACRO   None             Signed by: Sadia Arndt 7/30/2025 7:43 PM   Dictation workstation:   [id number]      CT cervical spine wo IV contrast   Final Result        No acute intracranial abnormality. Redemonstration of an empty sella,   nonspecific but may be seen with idiopathic intracranial hypertension.        No fracture, malalignment or spondylosis of the cervical spine.        MACRO   None             Signed by: Sadia Arndt 7/30/2025 7:43 PM   Dictation workstation:   [id number]           Procedures  Procedures     EMERGENCY DEPARTMENT COURSE/MDM:   Medical Decision Making  Patient is a 35-year-old female with no significant past medical history presenting to the ED with occipital head pain that goes down to her Thoracics.  She is hemodynamically stable and vital signs are within normal limits.  States that pain started in her occiput years ago but has gotten worse over the last month and is extended down to her Thoracics.  States that it is a tight feeling and there is paraspinal tenderness.  She denies any neurological symptoms such as paresthesias,  numbness, saddle anesthesia, urinary or bowel incontinence, decree strength or sensation.  Denies any blurry vision, dizziness, syncope, chest pain, shortness of breath, or vomiting.  States that pain is worsened with anxiety.    Differential includes but is not limited to migraine, tension headache, cluster headache. No headache red flags were noted on exam. Neurologic exam is without evidence of meningismus, focal neurological findings. Presentation is not consistent with acute intracranial bleed to include SAH given the lack of risk factors, as well as the patient's headache history. Presentation is not consistent with acute CNS infection to include meningitis or brain abscess, temporal arteritis unlikely, as is acute angle-closure glaucoma given history and physical findings. Presentation is not consistent with other acute, emergent causes of headache at this time. Plan to treat symptomatically with pain medication. No indication for imaging or LP at this time.  She did have some improvement in head pain after pain medication was given.  Provided patient with outpatient neurology follow-up.  Discussed discharge and return precautions with the patient and she was agreeable to the plan    Amount and/or Complexity of Data Reviewed  External Data Reviewed: notes.     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 Decision-making details documented in ED Course.  Radiology: ordered. Decision-making details documented in ED Course.        Please see ED course for additional MDM.    ED Course as of 07/31/25 1355   Wed Jul 30, 2025   1746 CBC and Auto Differential(!)  CBC does not show any leukocytosis or anemia [SL]   1807 Patient is neurologically intact.  Given prior history of Askey disease of the abdomen will obtain CT angio as well as CT imaging of the head and cervical spine.    Cranial nerves II through XII intact.  Strength 5 out of 5 in all 4 extremities.   Sensation intact to light touch in all 4 extremities.  No dysdiadochokinesia, no dysmetria.  Gait is narrow and stable.    Reglan Toradol and Tylenol given the emerged from as part of headache cocktail.  She has obvious paraspinal tenderness.  She endorses going down a slide sometime ago that may have exacerbated her symptoms.  She denies no known history of migraine disorder or headache disorder.  She has no cranial nerve deficit.  Consideration given for subarachnoid although I find this very unlikely given her onset of symptomatology is been going on for a month.  She has no step-off or deformity the spine.  She is neurologically intact good strength of the bilateral upper and lower extremities.  No radiation of the pain down the arms or legs.  CT imaging to be evaluated for any sign of osseous normality such as fracture or dislocation and angio to be obtained to evaluate for any sign of vascular disease.  This includes aneurysmal disease or dissection. [TL]   1841 Human Chorionic Gonadotropin, Serum Quantitative  Pregnancy test was negative [SL]   1841 Comprehensive metabolic panel(!)  CMP does not show any electrolyte abnormalities or acute kidney injuries [SL]   1921 CT angio head and neck w and wo IV contrast  The CT angiogram through the upper thorax demonstrates no significant  stenosis along the origins of the right brachiocephalic artery, left  common carotid artery, or left subclavian artery. No significant  stenosis noted along the origins of the vertebral arteries.      The CT angiogram of the neck demonstrates no significant stenosis  along the common carotid arteries, carotid bifurcations, or cervical  segments of the internal carotid arteries. No significant stenosis  noted along the cervical segments of the vertebral arteries.      The CT angiogram images of the head demonstrate no significant  stenosis along the distal internal carotid arteries, distal vertebral  arteries, or basilar artery. No  large vessel branch cutoffs of the  anterior cerebral arteries, middle cerebral arteries, or posterior  cerebral arteries are noted. No intracranial aneurysm is noted.   [SL]   1943 CT angio head and neck w and wo IV contrast [TL]   1947 No acute intracranial abnormality. Redemonstration of an empty sella,  nonspecific but may be seen with idiopathic intracranial hypertension.      No fracture, malalignment or spondylosis of the cervical spine.       [SL]   1953 IMPRESSION:      No acute intracranial abnormality. Redemonstration of an empty sella,  nonspecific but may be seen with idiopathic intracranial hypertension.      No fracture, malalignment or spondylosis of the cervical spine.       [TL]   1953 Will plan for outpatient follow-up with neurology. [TL]   2110 Patient updated on findings.  Already has a neurologist at the [facility].  Advised her that we can make an appointment.  Advised of her empty sella finding and need for further evaluation with the neurology team including potential consideration for IIH. [TL]      ED Course User Index  [SL] Stefani Lopresti, DO  [TL] Allen Person DO         Diagnoses as of 07/31/25 1355   Tension headache   Cervical muscle pain        CT angio head and neck w and wo IV contrast   Final Result   The CT angiogram through the upper thorax demonstrates no significant   stenosis along the origins of the right brachiocephalic artery, left   common carotid artery, or left subclavian artery. No significant   stenosis noted along the origins of the vertebral arteries.        The CT angiogram of the neck demonstrates no significant stenosis   along the common carotid arteries, carotid bifurcations, or cervical   segments of the internal carotid arteries. No significant stenosis   noted along the cervical segments of the vertebral arteries.        The CT angiogram images of the head demonstrate no significant   stenosis along the distal internal carotid arteries, distal  vertebral   arteries, or basilar artery. No large vessel branch cutoffs of the   anterior cerebral arteries, middle cerebral arteries, or posterior   cerebral arteries are noted. No intracranial aneurysm is noted.        Please see the dedicated reports of the CT of the head and CT of the   cervical spine dated 07/30/2025 for further details of findings in   these regions.        MACRO:   None        Signed by: Marcio Peterson 7/30/2025 7:09 PM   Dictation workstation:   [id number]      CT head wo IV contrast   Final Result        No acute intracranial abnormality. Redemonstration of an empty sella,   nonspecific but may be seen with idiopathic intracranial hypertension.        No fracture, malalignment or spondylosis of the cervical spine.        MACRO   None             Signed by: Sadia Arndt 7/30/2025 7:43 PM   Dictation workstation:   [id number]      CT cervical spine wo IV contrast   Final Result        No acute intracranial abnormality. Redemonstration of an empty sella,   nonspecific but may be seen with idiopathic intracranial hypertension.        No fracture, malalignment or spondylosis of the cervical spine.        MACRO   None             Signed by: Sadia Arndt 7/30/2025 7:43 PM   Dictation workstation:   [id number]          Patient and or family in agreement and understanding of treatment plan.  All questions answered.      I reviewed the case with the attending ED physician. The attending ED physician agrees with the plan. Patient and/or patient´s representative was counseled regarding labs, imaging, likely diagnosis, and plan. All questions were answered.    ED Medications administered this visit:    Medications   acetaminophen (Tylenol) tablet 650 mg (650 mg oral Given 7/30/25 1715)   ketorolac (Toradol) injection 15 mg (15 mg intravenous Given 7/30/25 1849)   iohexol (OMNIPaque) 350 mg iodine/mL solution 70 mL (70 mL intravenous Given 7/30/25 1824)       New Prescriptions from this visit:     There are no discharge medications for this patient.      Follow-up:  Maria Eugenia Jiang DO  [address]   [facility] [address]  [phone]          Maria Eugenia Jiang DO  [address]   [facility] [address]  [phone]    In 2 days      Flavia Sy MD  [address]  [facility] [address] [phone]              Final Impression:   1. Tension headache    2. Cervical muscle pain          (Please note that portions of this note were completed with a voice recognition program.  Efforts were made to edit the dictations but occasionally words are mis-transcribed.)             Stefani Lopresti, DO  Resident  07/30/25 2050       [1]   Past Medical History:  Diagnosis Date    Anxiety disorder, unspecified     Anxiety and depression    Personal history of other diseases of the nervous system and sense organs 03/17/2019    History of eustachian tube dysfunction   [2]   Past Surgical History:  Procedure Laterality Date    CHOLECYSTECTOMY  02/24/2016    Cholecystectomy    COLONOSCOPY  02/24/2016    Complete Colonoscopy    UPPER GASTROINTESTINAL ENDOSCOPY  02/24/2016    Endoscopic Ultrasound Upper Gastrointestinal Esophageal   [3] No family history on file.  [4]   Social History  Socioeconomic History    Marital status:    Tobacco Use    Smoking status: Never    Smokeless tobacco: Never   Substance and Sexual Activity    Alcohol use: Never    Drug use: Never    Sexual activity: Defer     Social Drivers of Health     Financial Resource Strain: High Risk (2/22/2021)    Received from [facility]    Overall Financial Resource Strain (CARDIA)     Difficulty of Paying Living Expenses: Hard   Food Insecurity: Food Insecurity Present (2/22/2021)    Received from [facility]    Hunger Vital Sign     Within the past 12 months, you worried that your food would run out before you got the money to buy more.: Sometimes true     Within the past 12 months, the food you bought  just didn't last and you didn't have money to get more.: Patient declined   Transportation Needs: Unknown (2/22/2021)    Received from [facility]    PRAPARE - Transportation     Lack of Transportation (Medical): Patient declined     Lack of Transportation (Non-Medical): Patient declined   Physical Activity: Insufficiently Active (2/22/2021)    Received from [facility]    Exercise Vital Sign     On average, how many days per week do you engage in moderate to strenuous exercise (like a brisk walk)?: 1 day     On average, how many minutes do you engage in exercise at this level?: 20 min   Stress: No Stress Concern Present (2/22/2021)    Received from [facility]    [employer] - Occupational Stress Questionnaire     Feeling of Stress : Only a little   Social Connections: Unknown (2/22/2021)    Received from [facility]    Social Connection and Isolation Panel     In a typical week, how many times do you talk on the phone with family, friends, or neighbors?: More than three times a week     How often do you get together with friends or relatives?: More than three times a week     How often do you attend Catholic or Church services?: Patient declined     Do you belong to any clubs or organizations such as Catholic groups, unions, fraternal or athletic groups, or school groups?: No     How often do you attend meetings of the clubs or organizations you belong to?: Patient declined     Are you , , , , never , or living with a partner?:    Housing Stability: Unknown (2/22/2021)    Received from [facility]    Housing Stability Vital Sign     Unable to Pay for Housing in the Last Year: Patient refused     Unstable Housing in the Last Year: Patient refused        Stefani Lopresti, DO  Resident  07/31/25 9643

## 2026-01-29 ENCOUNTER — APPOINTMENT (OUTPATIENT)
Dept: NEUROLOGY | Facility: CLINIC | Age: 36
End: 2026-01-29
Payer: COMMERCIAL